# Patient Record
Sex: FEMALE | Race: WHITE | NOT HISPANIC OR LATINO | Employment: OTHER | URBAN - METROPOLITAN AREA
[De-identification: names, ages, dates, MRNs, and addresses within clinical notes are randomized per-mention and may not be internally consistent; named-entity substitution may affect disease eponyms.]

---

## 2023-03-01 PROBLEM — E22.1 HYPERPROLACTINEMIA (MULTI): Status: ACTIVE | Noted: 2023-03-01

## 2023-03-01 PROBLEM — F50.9 EATING DISORDER IN REMISSION: Status: ACTIVE | Noted: 2023-03-01

## 2023-03-01 PROBLEM — R92.30 DENSE BREASTS: Status: ACTIVE | Noted: 2023-03-01

## 2023-03-01 PROBLEM — R53.83 FATIGUE: Status: ACTIVE | Noted: 2023-03-01

## 2023-03-01 PROBLEM — F31.9 BIPOLAR 1 DISORDER (MULTI): Status: ACTIVE | Noted: 2023-03-01

## 2023-03-01 PROBLEM — R92.2 DENSE BREASTS: Status: ACTIVE | Noted: 2023-03-01

## 2023-03-01 PROBLEM — H61.23 BILATERAL IMPACTED CERUMEN: Status: ACTIVE | Noted: 2023-03-01

## 2023-03-01 PROBLEM — H91.91 HEARING LOSS IN RIGHT EAR: Status: ACTIVE | Noted: 2023-03-01

## 2023-03-01 PROBLEM — R63.4 WEIGHT LOSS, UNINTENTIONAL: Status: ACTIVE | Noted: 2023-03-01

## 2023-03-01 PROBLEM — G47.00 INSOMNIA: Status: ACTIVE | Noted: 2023-03-01

## 2023-03-01 PROBLEM — J45.909 ASTHMA, WELL CONTROLLED (HHS-HCC): Status: ACTIVE | Noted: 2023-03-01

## 2023-03-01 PROBLEM — H91.92 HEARING DIFFICULTY OF LEFT EAR: Status: ACTIVE | Noted: 2023-03-01

## 2023-03-01 PROBLEM — H10.13 ALLERGIC CONJUNCTIVITIS OF BOTH EYES: Status: ACTIVE | Noted: 2023-03-01

## 2023-03-01 PROBLEM — D12.6 ADENOMATOUS POLYP OF COLON: Status: ACTIVE | Noted: 2023-03-01

## 2023-03-01 PROBLEM — F10.21 ALCOHOLISM IN REMISSION (MULTI): Status: ACTIVE | Noted: 2023-03-01

## 2023-03-01 PROBLEM — M85.80 OSTEOPENIA: Status: ACTIVE | Noted: 2023-03-01

## 2023-03-01 PROBLEM — R73.01 ELEVATED FASTING BLOOD SUGAR: Status: ACTIVE | Noted: 2023-03-01

## 2023-03-01 PROBLEM — M19.041 OSTEOARTHRITIS OF FINGER, RIGHT: Status: ACTIVE | Noted: 2023-03-01

## 2023-03-01 PROBLEM — H61.23 EXCESSIVE CERUMEN IN BOTH EAR CANALS: Status: ACTIVE | Noted: 2023-03-01

## 2023-03-01 PROBLEM — E78.00 HYPERCHOLESTEROLEMIA: Status: ACTIVE | Noted: 2023-03-01

## 2023-03-01 PROBLEM — E55.9 VITAMIN D DEFICIENCY: Status: ACTIVE | Noted: 2023-03-01

## 2023-03-01 PROBLEM — F17.200 CURRENT EVERY DAY SMOKER: Status: ACTIVE | Noted: 2023-03-01

## 2023-03-01 PROBLEM — K21.9 ESOPHAGEAL REFLUX: Status: ACTIVE | Noted: 2023-03-01

## 2023-03-01 RX ORDER — ATORVASTATIN CALCIUM 40 MG/1
1 TABLET, FILM COATED ORAL DAILY
COMMUNITY
Start: 2019-02-05 | End: 2023-03-09 | Stop reason: SDUPTHER

## 2023-03-01 RX ORDER — RISPERIDONE 0.5 MG/1
TABLET ORAL
COMMUNITY
Start: 2018-01-31

## 2023-03-01 RX ORDER — TRAZODONE HYDROCHLORIDE 50 MG/1
1.5 TABLET ORAL NIGHTLY
COMMUNITY
Start: 2017-03-20 | End: 2023-03-09 | Stop reason: SDUPTHER

## 2023-03-01 RX ORDER — DESVENLAFAXINE 50 MG/1
1 TABLET, EXTENDED RELEASE ORAL
COMMUNITY
Start: 2017-05-21

## 2023-03-01 RX ORDER — RISPERIDONE 1 MG/1
1 TABLET ORAL EVERY MORNING
COMMUNITY
Start: 2021-03-31

## 2023-03-01 RX ORDER — LAMOTRIGINE 200 MG/1
1 TABLET ORAL EVERY MORNING
COMMUNITY
Start: 2018-02-01

## 2023-03-01 RX ORDER — GABAPENTIN 300 MG/1
2 CAPSULE ORAL NIGHTLY
COMMUNITY
Start: 2017-11-27 | End: 2023-04-24 | Stop reason: SDUPTHER

## 2023-03-09 ENCOUNTER — OFFICE VISIT (OUTPATIENT)
Dept: PRIMARY CARE | Facility: CLINIC | Age: 63
End: 2023-03-09
Payer: MEDICARE

## 2023-03-09 VITALS
OXYGEN SATURATION: 96 % | HEART RATE: 62 BPM | HEIGHT: 62 IN | WEIGHT: 122.2 LBS | DIASTOLIC BLOOD PRESSURE: 80 MMHG | SYSTOLIC BLOOD PRESSURE: 130 MMHG | BODY MASS INDEX: 22.49 KG/M2

## 2023-03-09 DIAGNOSIS — Z12.31 SCREENING MAMMOGRAM, ENCOUNTER FOR: ICD-10-CM

## 2023-03-09 DIAGNOSIS — F10.21 ALCOHOLISM IN REMISSION (MULTI): ICD-10-CM

## 2023-03-09 DIAGNOSIS — F51.04 CHRONIC INSOMNIA: ICD-10-CM

## 2023-03-09 DIAGNOSIS — Z00.00 MEDICARE ANNUAL WELLNESS VISIT, INITIAL: Primary | ICD-10-CM

## 2023-03-09 DIAGNOSIS — E22.1 HYPERPROLACTINEMIA (MULTI): ICD-10-CM

## 2023-03-09 DIAGNOSIS — F17.200 CURRENT EVERY DAY SMOKER: ICD-10-CM

## 2023-03-09 DIAGNOSIS — E78.00 HYPERCHOLESTEROLEMIA: ICD-10-CM

## 2023-03-09 DIAGNOSIS — F17.210 CIGARETTE NICOTINE DEPENDENCE WITHOUT COMPLICATION: ICD-10-CM

## 2023-03-09 DIAGNOSIS — Z78.0 ASYMPTOMATIC MENOPAUSE: ICD-10-CM

## 2023-03-09 DIAGNOSIS — F31.9 BIPOLAR 1 DISORDER (MULTI): ICD-10-CM

## 2023-03-09 PROBLEM — R73.01 ELEVATED FASTING BLOOD SUGAR: Status: RESOLVED | Noted: 2023-03-01 | Resolved: 2023-03-09

## 2023-03-09 PROBLEM — R53.83 FATIGUE: Status: RESOLVED | Noted: 2023-03-01 | Resolved: 2023-03-09

## 2023-03-09 PROBLEM — H10.13 ALLERGIC CONJUNCTIVITIS OF BOTH EYES: Status: RESOLVED | Noted: 2023-03-01 | Resolved: 2023-03-09

## 2023-03-09 PROBLEM — H61.23 EXCESSIVE CERUMEN IN BOTH EAR CANALS: Status: RESOLVED | Noted: 2023-03-01 | Resolved: 2023-03-09

## 2023-03-09 PROBLEM — R63.4 WEIGHT LOSS, UNINTENTIONAL: Status: RESOLVED | Noted: 2023-03-01 | Resolved: 2023-03-09

## 2023-03-09 PROBLEM — H61.23 BILATERAL IMPACTED CERUMEN: Status: RESOLVED | Noted: 2023-03-01 | Resolved: 2023-03-09

## 2023-03-09 PROCEDURE — 99214 OFFICE O/P EST MOD 30 MIN: CPT | Performed by: STUDENT IN AN ORGANIZED HEALTH CARE EDUCATION/TRAINING PROGRAM

## 2023-03-09 PROCEDURE — G0402 INITIAL PREVENTIVE EXAM: HCPCS | Performed by: STUDENT IN AN ORGANIZED HEALTH CARE EDUCATION/TRAINING PROGRAM

## 2023-03-09 PROCEDURE — G0403 EKG FOR INITIAL PREVENT EXAM: HCPCS | Performed by: STUDENT IN AN ORGANIZED HEALTH CARE EDUCATION/TRAINING PROGRAM

## 2023-03-09 PROCEDURE — 1159F MED LIST DOCD IN RCRD: CPT | Performed by: STUDENT IN AN ORGANIZED HEALTH CARE EDUCATION/TRAINING PROGRAM

## 2023-03-09 PROCEDURE — 4004F PT TOBACCO SCREEN RCVD TLK: CPT | Performed by: STUDENT IN AN ORGANIZED HEALTH CARE EDUCATION/TRAINING PROGRAM

## 2023-03-09 PROCEDURE — 1160F RVW MEDS BY RX/DR IN RCRD: CPT | Performed by: STUDENT IN AN ORGANIZED HEALTH CARE EDUCATION/TRAINING PROGRAM

## 2023-03-09 PROCEDURE — 1157F ADVNC CARE PLAN IN RCRD: CPT | Performed by: STUDENT IN AN ORGANIZED HEALTH CARE EDUCATION/TRAINING PROGRAM

## 2023-03-09 PROCEDURE — 1170F FXNL STATUS ASSESSED: CPT | Performed by: STUDENT IN AN ORGANIZED HEALTH CARE EDUCATION/TRAINING PROGRAM

## 2023-03-09 RX ORDER — TRAZODONE HYDROCHLORIDE 50 MG/1
75 TABLET ORAL NIGHTLY
Qty: 135 TABLET | Refills: 3 | Status: SHIPPED | OUTPATIENT
Start: 2023-03-09 | End: 2024-03-01 | Stop reason: SDUPTHER

## 2023-03-09 RX ORDER — ATORVASTATIN CALCIUM 40 MG/1
40 TABLET, FILM COATED ORAL DAILY
Qty: 90 TABLET | Refills: 3 | Status: SHIPPED | OUTPATIENT
Start: 2023-03-09 | End: 2024-04-02 | Stop reason: SDUPTHER

## 2023-03-09 ASSESSMENT — ACTIVITIES OF DAILY LIVING (ADL)
GROCERY_SHOPPING: INDEPENDENT
MANAGING_FINANCES: INDEPENDENT
TAKING_MEDICATION: INDEPENDENT
DRESSING: INDEPENDENT
BATHING: INDEPENDENT
DOING_HOUSEWORK: INDEPENDENT

## 2023-03-09 ASSESSMENT — PATIENT HEALTH QUESTIONNAIRE - PHQ9
1. LITTLE INTEREST OR PLEASURE IN DOING THINGS: NOT AT ALL
2. FEELING DOWN, DEPRESSED OR HOPELESS: NOT AT ALL
SUM OF ALL RESPONSES TO PHQ9 QUESTIONS 1 AND 2: 0

## 2023-03-09 NOTE — LETTER
April 24, 2023     Karen Joshua  [address] [address]      Dear Ms. Joshua:    Below are the results from your recent visit:  Normal screening mammogram     Resulted Orders   BI mammo bilateral screening tomosynthesis    Narrative    Interpreted By:  TIP ALCARAZ MD  MRN: [id number]  Patient Name: KAREN JOSHUA     STUDY:  DIGITAL MAMM SCREENING W/ PAULINE;  4/21/2023 1:37 pm     ACCESSION NUMBER(S):  [id number]     ORDERING CLINICIAN:  SOM WATTS     INDICATION:  Screening.     COMPARISON:  02/23/2021 03/16/2017     FINDINGS:  2D and tomosynthesis images were reviewed at 1 mm slice thickness.     The breast tissue is heterogeneously dense, which may obscure small  masses.   No suspicious masses or calcifications are identified.       Impression    No mammographic evidence of malignancy.     BI-RADS CATEGORY:     Category: 1 - Negative.  Recommendation: 1 Year Screening.     For any future breast imaging appointments, please call [phone]  [phone]).               CT lung screening low dose    Narrative    Interpreted By:  SWAPNA RECIO MD  MRN: [id number]  Patient Name: KAREN JOSHUA     STUDY:  TH CT CHEST LOW DOSE FOR LUNG SCREENING WO CONTRAST;  4/21/2023 1:52  pm     INDICATION:  Smoker screening     COMPARISON:  No prior     ACCESSION NUMBER(S):  [id number]     ORDERING CLINICIAN:  SOM WATTS     TECHNIQUE:  Helical data acquisition of the chest was obtained without IV  contrast material.  Images were reformatted in axial, coronal, and  sagittal planes.     FINDINGS:  LUNGS AND AIRWAYS:  Mild generalized airway thickening no justin emphysematous change.  Coarsely calcified right middle lobe granulomata association with  linear presumed scarring. Additional scattered areas of mild linear  atelectasis or scarring in the lower lungs. Other scattered less than  4 mm nodular densities bilaterally, 1 of which is calcified on the  right. These are annotated for reference on  series 3. No mass, dense  consolidation or central airway obstruction.     MEDIASTINUM AND CARLOS, LOWER NECK AND AXILLA:  No intrathoracic lymphadenopathy. Nondilated esophagus. No masses are  identified in the lower neck.     HEART AND VESSELS:  Normal heart size. No pericardial effusion. Normal caliber thoracic  aorta and pulmonary trunk. Mild atherosclerotic calcifications.     UPPER ABDOMEN:  Low-attenuation 1.9 cm right hepatic lobe lesion on series 2, image  57 averaging 44 Hounsfield units.     CHEST WALL AND OSSEOUS STRUCTURES:  No significant soft tissue findings. No aggressive osseous finding on  CT.       Impression    1. Tiny noncalcified nodular densities bilaterally of doubtful  clinical significance. Advise ongoing annual low-dose lung cancer  screening CT.  2. 1.9 cm low-attenuation right hepatic lobe lesion measures greater  than simple fluid attenuation and is presumably benign in the absence  of a known underlying malignancy, probably a hemangioma. This could  be correlated with a dedicated hepatic protocol cross-sectional  imaging study of the abdomen if there is clinical concern.           LUNG RADS CATEGORY:  Lung-RADS 2, S (Benign Appearance or Indolent Behavior): Continue  with annual screening in 12 months,  CT Chest Lung Ca Screen  Initial or Follow up LR1/LR2/Continuation of Screening Low Dose  recommended as per American College of Radiology Guidelines Lung-RADS  Version 2022.  Lung-RADS S (Other non-nodular findings) Management as  appropriate to finding per American College of Radiology Guidelines  Lung-RADS Version 2022      XR DEXA bone density    Narrative    Interpreted By:  MARSHAL BYRNE MD  MRN: [id number]  Patient Name: KAREN HYLTON     STUDY:  BONE DENSITY, DEXA 1 OR MORE SITES: AXIAL SKELETN4/21/2023 1:10 pm     INDICATION:  screening. The patient is a 64 y/o  year old F.     COMPARISON:  None.     ACCESSION NUMBER(S):  [id number]     ORDERING CLINICIAN:  SOM  MAC     TECHNIQUE:  BONE DENSITY, DEXA 1 OR MORE SITES: AXIAL SKELETN     FINDINGS:  SPINE L1-L4  Bone Mineral Density: 0.855 g/cm2  T-Score -1.7  Z-Score 0.0  Classification:  Osteopenia  Bone Mineral Density change vs baseline:  Not reported  Bone Mineral Density change vs previous: Not reported     LEFT FEMUR -TOTAL  Bone Mineral Density: 0.764 g/cm2  T-Score -1.5   Z-Score  -0.2  Classification:  Osteopenia  Bone Mineral Density change vs baseline: Not reported  Bone Mineral Density change vs previous: Not reported     LEFT FEMUR -NECK  Bone Mineral Density: 0.655 g/cm2  T-Score -1.7  Z-Score -0.2  Classification:  Osteopenia        World Health Organization (WHO) criteria for post-menopausal,   Women:  Normal:         T-score at or above -1 SD  Osteopenia:   T-score between -1 and -2.5 SD  Osteoporosis: T-score at or below -2.5 SD        10-year Fracture Risk:  Major Osteoporotic Fracture  8.8 %  Hip Fracture                        1.9 %     Note:  If no FRAX score is reported, it is because:  Some T-score for Spine Total or Hip Total or Femoral Neck at or below  -2.5     This exam was performed at [facility] on a [facility]  [facility] Dexa Unit.             Impression    DEXA:  According to World Health Organization criteria,  classification is  low bone mass (osteopenia)     Followup recommended in two years or sooner as clinically warranted.     The test results show that your current treatment is working. Please review the attached information    If you have any questions or concerns, please don't hesitate to call.         Sincerely,        Myra Bingham MD

## 2023-03-09 NOTE — LETTER
April 24, 2023     Karen Yipthal  [address] [address]      Dear Ms. Joshua:    Below are the results from your recent visit:    Osteopenia  ( weaker bones ) noted on bone density scan .   Calcium + vitamin D + weight bearing exercise = strong bones .   1200 mg of Calcium per day ( This could be from diet as well as supplements , if you do not get enough from diet ) + 2000 U of Vit D3 everyday ( OTC ) is recommended .     There is loss of bone in women as a results of menopause. Some medical conditions can also results in loss of bone density sooner than expected . Smoking during one's life time , prolonged use of certain medications to treat other medical conditions also increases the risk of osteopenia.     Resulted Orders   XR DEXA bone density    Narrative    Interpreted By:  MARSHAL BYRNE MD  MRN: [id number]  Patient Name: KAREN JOSHUA     STUDY:  BONE DENSITY, DEXA 1 OR MORE SITES: AXIAL SKELETN4/21/2023 1:10 pm     INDICATION:  screening. The patient is a 62 y/o  year old F.     COMPARISON:  None.     ACCESSION NUMBER(S):  [id number]     ORDERING CLINICIAN:  SOM WATTS     TECHNIQUE:  BONE DENSITY, DEXA 1 OR MORE SITES: AXIAL SKELETN     FINDINGS:  SPINE L1-L4  Bone Mineral Density: 0.855 g/cm2  T-Score -1.7  Z-Score 0.0  Classification:  Osteopenia  Bone Mineral Density change vs baseline:  Not reported  Bone Mineral Density change vs previous: Not reported     LEFT FEMUR -TOTAL  Bone Mineral Density: 0.764 g/cm2  T-Score -1.5   Z-Score  -0.2  Classification:  Osteopenia  Bone Mineral Density change vs baseline: Not reported  Bone Mineral Density change vs previous: Not reported     LEFT FEMUR -NECK  Bone Mineral Density: 0.655 g/cm2  T-Score -1.7  Z-Score -0.2  Classification:  Osteopenia        World Health Organization (WHO) criteria for post-menopausal,   Women:  Normal:         T-score at or above -1 SD  Osteopenia:   T-score between -1 and -2.5  SD  Osteoporosis: T-score at or below -2.5 SD        10-year Fracture Risk:  Major Osteoporotic Fracture  8.8 %  Hip Fracture                        1.9 %     Note:  If no FRAX score is reported, it is because:  Some T-score for Spine Total or Hip Total or Femoral Neck at or below  -2.5     This exam was performed at [facility] on a [facility] Dexa Unit.             Impression    DEXA:  According to World Health Organization criteria,  classification is  low bone mass (osteopenia)     Followup recommended in two years or sooner as clinically warranted.     The test results show that your current treatment is working. Please review the attached information    If you have any questions or concerns, please don't hesitate to call.         Sincerely,        Myra Bingham MD

## 2023-03-09 NOTE — PROGRESS NOTES
"Subjective   Reason for Visit: Sherry Joshua is an 63 y.o. female here for a Medicare Wellness visit.      Reviewed all medications by prescribing practitioner or clinical pharmacist (such as prescriptions, OTCs, herbal therapies and supplements) and documented in the medical record.    HPI     62 y/o female with eating disorder , former alcoholic , current smoker ( 4 cigarettes per day as of 3/3/22) , bipolar disorder( established with Psychiatry ) , insomnia , HPL            Patient Care Team:  Myra Bingham MD as PCP - General     Review of Systems    Constitutional: no chills, no fever and no night sweats.     Eyes: no blurred vision and no eyesight problems.     ENT: no hearing loss, no nasal congestion, no nasal discharge, no hoarseness and no sore throat.     Cardiovascular: no chest pain, no intermittent leg claudication, no lower extremity edema, no palpitations and no syncope.     Respiratory: no cough, no shortness of breath during exertion, no shortness of breath at rest and no wheezing.     Gastrointestinal: no abdominal pain, no blood in stools, no constipation, no diarrhea, no melena, no nausea, no rectal pain and no vomiting.     Genitourinary: no dysuria, no change in urinary frequency, no urinary hesitancy, no feelings of urinary urgency and no vaginal discharge.     Musculoskeletal: no arthralgias, no back pain and no myalgias.     Integumentary: no new skin lesions and no rashes.     Neurological: no difficulty walking, no headache, no limb weakness, no numbness and no tingling.     Psychiatric: no anxiety, no depression, no anhedonia and no substance use disorders.     Endocrine: no recent weight gain and no recent weight loss.     Hematologic/Lymphatic: no tendency for easy bruising and no swollen glands.          All other systems have been reviewed and are negative for complaint.      Objective   Vitals:  Pulse 62   Ht 1.575 m (5' 2\")   Wt 55.4 kg (122 lb 3.2 oz)   SpO2 96%   " BMI 22.35 kg/m²       Physical Exam    Constitutional: Alert and in no acute distress. Well developed, well nourished.     Eyes: Normal external exam. Pupils were equal in size, round, reactive to light (PERRL) with normal accommodation and extraocular movements intact (EOMI).     Ears, Nose, Mouth, and Throat: External inspection of ears and nose: Normal.  Otoscopic examination: Normal.      Neck: No neck mass was observed. Supple.     Cardiovascular: Heart rate and rhythm were normal, normal S1 and S2, no gallops, no murmurs and no pericardial rub    Pulmonary: No respiratory distress. Clear bilateral breath sounds.     Abdomen: Soft nontender; no abdominal mass palpated. No organomegaly.     Genitourinary: no dysuria, no change in urinary frequency, no urinary hesitancy, no feelings of urinary urgency and no vaginal discharge.    Genitourinary: The testicles were not swollen and there were no testicular masses. No penile abnormalities.      Musculoskeletal: No joint swelling seen, normal movements of all extremities. Range of motion: Normal.  Muscle strength/tone: Normal.      Skin: Normal skin color and pigmentation, normal skin turgor, and no rash.     Neurologic: Deep tendon reflexes were 2+ and symmetric. Sensation: Normal.     Psychiatric: Judgment and insight: Intact. Mood and affect: Normal.    Lymphatic : Cervical/ axillary/ groin Lns Palpable/ non palpable   Assessment/Plan   64 y/o female with eating disorder , former alcoholic , current smoker ( 4 cigarettes per day as of 3/3/22) , bipolar disorder( established with Psychiatry ) , insomnia , HPL        Chronic medical conditions: Reviewed , assessed , stable, meds refilled.   - HPL   - Chronic insomnia     Age appropriate labs / labs for mgmt of chronic medical conditions ordered, further mgmt pending the results.      Risk factors identified during visit :   None      Immunizations :  Influenza : Given today or previously / recommended annually in the  fall/ declined/ allergic   Prevnar 20 : Given today or previously / recommended / declined/ allergic   Pneumovax 23: Given today or previously / recommended / declined/ allergic   Shingles: Given today or previously / recommended to receive at the pharmacy/ declined/ allergic    Cancer screenings:   Mammogram :   Screening ORdered  Cervical cancer:  Screening current. 8/2022, NILM, HPV negative   Colon cancer:     Screening current , due 10/2026  Lung cancer :     Screening current /ordered/ declined/ not indicated  HIV screening:   Screening  not indicated  Osteoporosis :   Screening ordered    Advance care planning :   Has a living will .   Declined life support .    RTO in a year or sooner if needed

## 2023-03-09 NOTE — LETTER
April 24, 2023     Karen Hylton  [address] [address]      Dear Ms. Hylton:    Below are the results from your recent visit:      No cancer seen in the lungs   Rpt In 1 yr   Resulted Orders   CT lung screening low dose    Narrative    Interpreted By:  SWAPNA RECIO MD  MRN: [id number]  Patient Name: KAREN HYLTON     STUDY:  TH CT CHEST LOW DOSE FOR LUNG SCREENING WO CONTRAST;  4/21/2023 1:52  pm     INDICATION:  Smoker screening     COMPARISON:  No prior     ACCESSION NUMBER(S):  [id number]     ORDERING CLINICIAN:  SOM WATTS     TECHNIQUE:  Helical data acquisition of the chest was obtained without IV  contrast material.  Images were reformatted in axial, coronal, and  sagittal planes.     FINDINGS:  LUNGS AND AIRWAYS:  Mild generalized airway thickening no justin emphysematous change.  Coarsely calcified right middle lobe granulomata association with  linear presumed scarring. Additional scattered areas of mild linear  atelectasis or scarring in the lower lungs. Other scattered less than  4 mm nodular densities bilaterally, 1 of which is calcified on the  right. These are annotated for reference on series 3. No mass, dense  consolidation or central airway obstruction.     MEDIASTINUM AND CARLOS, LOWER NECK AND AXILLA:  No intrathoracic lymphadenopathy. Nondilated esophagus. No masses are  identified in the lower neck.     HEART AND VESSELS:  Normal heart size. No pericardial effusion. Normal caliber thoracic  aorta and pulmonary trunk. Mild atherosclerotic calcifications.     UPPER ABDOMEN:  Low-attenuation 1.9 cm right hepatic lobe lesion on series 2, image  57 averaging 44 Hounsfield units.     CHEST WALL AND OSSEOUS STRUCTURES:  No significant soft tissue findings. No aggressive osseous finding on  CT.       Impression    1. Tiny noncalcified nodular densities bilaterally of doubtful  clinical significance. Advise ongoing annual low-dose lung cancer  screening CT.  2. 1.9 cm  low-attenuation right hepatic lobe lesion measures greater  than simple fluid attenuation and is presumably benign in the absence  of a known underlying malignancy, probably a hemangioma. This could  be correlated with a dedicated hepatic protocol cross-sectional  imaging study of the abdomen if there is clinical concern.           LUNG RADS CATEGORY:  Lung-RADS 2, S (Benign Appearance or Indolent Behavior): Continue  with annual screening in 12 months,  CT Chest Lung Ca Screen  Initial or Follow up LR1/LR2/Continuation of Screening Low Dose  recommended as per American College of Radiology Guidelines Lung-RADS  Version 2022.  Lung-RADS S (Other non-nodular findings) Management as  appropriate to finding per American College of Radiology Guidelines  Lung-RADS Version 2022      XR DEXA bone density    Narrative    Interpreted By:  MARSHAL BYRNE MD  MRN: [id number]  Patient Name: KAREN HYLTON     STUDY:  BONE DENSITY, DEXA 1 OR MORE SITES: AXIAL SKELETN4/21/2023 1:10 pm     INDICATION:  screening. The patient is a 64 y/o  year old F.     COMPARISON:  None.     ACCESSION NUMBER(S):  [id number]     ORDERING CLINICIAN:  SOM WATTS     TECHNIQUE:  BONE DENSITY, DEXA 1 OR MORE SITES: AXIAL SKELETN     FINDINGS:  SPINE L1-L4  Bone Mineral Density: 0.855 g/cm2  T-Score -1.7  Z-Score 0.0  Classification:  Osteopenia  Bone Mineral Density change vs baseline:  Not reported  Bone Mineral Density change vs previous: Not reported     LEFT FEMUR -TOTAL  Bone Mineral Density: 0.764 g/cm2  T-Score -1.5   Z-Score  -0.2  Classification:  Osteopenia  Bone Mineral Density change vs baseline: Not reported  Bone Mineral Density change vs previous: Not reported     LEFT FEMUR -NECK  Bone Mineral Density: 0.655 g/cm2  T-Score -1.7  Z-Score -0.2  Classification:  Osteopenia        World Health Organization (WHO) criteria for post-menopausal,   Women:  Normal:         T-score at or above -1 SD  Osteopenia:   T-score  between -1 and -2.5 SD  Osteoporosis: T-score at or below -2.5 SD        10-year Fracture Risk:  Major Osteoporotic Fracture  8.8 %  Hip Fracture                        1.9 %     Note:  If no FRAX score is reported, it is because:  Some T-score for Spine Total or Hip Total or Femoral Neck at or below  -2.5     This exam was performed at [facility] on a [facility] Dexa Unit.             Impression    DEXA:  According to World Health Organization criteria,  classification is  low bone mass (osteopenia)     Followup recommended in two years or sooner as clinically warranted.     The test results show that your current treatment is working. Please review the attached information    If you have any questions or concerns, please don't hesitate to call.         Sincerely,        Myra Bingham MD

## 2023-03-10 ASSESSMENT — ACTIVITIES OF DAILY LIVING (ADL)
MANAGING_FINANCES: INDEPENDENT
BATHING: INDEPENDENT
GROCERY_SHOPPING: INDEPENDENT
TAKING_MEDICATION: INDEPENDENT
DRESSING: INDEPENDENT
DOING_HOUSEWORK: INDEPENDENT

## 2023-03-10 ASSESSMENT — PATIENT HEALTH QUESTIONNAIRE - PHQ9
SUM OF ALL RESPONSES TO PHQ9 QUESTIONS 1 AND 2: 0
2. FEELING DOWN, DEPRESSED OR HOPELESS: NOT AT ALL
1. LITTLE INTEREST OR PLEASURE IN DOING THINGS: NOT AT ALL

## 2023-03-10 NOTE — ADDENDUM NOTE
Addended by: ADRIENNE WATTS on: 3/9/2023 07:25 PM     Modules accepted: Orders, Level of Service

## 2023-03-14 PROCEDURE — G0009 ADMIN PNEUMOCOCCAL VACCINE: HCPCS | Performed by: STUDENT IN AN ORGANIZED HEALTH CARE EDUCATION/TRAINING PROGRAM

## 2023-03-14 PROCEDURE — 90677 PCV20 VACCINE IM: CPT | Performed by: STUDENT IN AN ORGANIZED HEALTH CARE EDUCATION/TRAINING PROGRAM

## 2023-03-14 ASSESSMENT — ACTIVITIES OF DAILY LIVING (ADL)
GROCERY_SHOPPING: INDEPENDENT
MANAGING_FINANCES: INDEPENDENT
BATHING: INDEPENDENT
DOING_HOUSEWORK: INDEPENDENT
DRESSING: INDEPENDENT
TAKING_MEDICATION: INDEPENDENT

## 2023-04-24 DIAGNOSIS — F31.9 BIPOLAR 1 DISORDER (MULTI): ICD-10-CM

## 2023-04-24 DIAGNOSIS — G47.00 INSOMNIA, UNSPECIFIED TYPE: ICD-10-CM

## 2023-04-24 DIAGNOSIS — F50.9 EATING DISORDER IN REMISSION: ICD-10-CM

## 2023-04-24 NOTE — TELEPHONE ENCOUNTER
----- Message from Myra Bingham MD sent at 4/24/2023  2:44 PM EDT -----  Normal screening mammogram

## 2023-04-24 NOTE — TELEPHONE ENCOUNTER
----- Message from Myra Bingham MD sent at 4/24/2023  2:42 PM EDT -----  No cancer seen in the lungs   Rpt In 1 yr

## 2023-04-24 NOTE — TELEPHONE ENCOUNTER
----- Message from Myra Bingham MD sent at 4/24/2023  2:37 PM EDT -----  Mail this please   Osteopenia  ( weaker bones ) noted on bone density scan .  Calcium + vitamin D + weight bearing exercise = strong bones .  1200 mg of Calcium per day ( This could be from diet as well as supplements , if you do not get enough from diet ) + 2000 U of Vit D3 everyday ( OTC ) is recommended .    There is loss of bone in women as a results of menopause. Some medical conditions can also results in loss of bone density sooner than expected . Smoking during one's life time , prolonged use of certain medications to treat other medical conditions also increases the risk of osteopenia.

## 2023-04-26 RX ORDER — GABAPENTIN 300 MG/1
600 CAPSULE ORAL NIGHTLY
Qty: 180 CAPSULE | Refills: 3 | Status: SHIPPED | OUTPATIENT
Start: 2023-04-26 | End: 2023-10-23 | Stop reason: SDUPTHER

## 2023-10-23 ENCOUNTER — LAB (OUTPATIENT)
Dept: LAB | Facility: LAB | Age: 63
End: 2023-10-23
Payer: MEDICARE

## 2023-10-23 DIAGNOSIS — Z79.899 OTHER LONG TERM (CURRENT) DRUG THERAPY: Primary | ICD-10-CM

## 2023-10-23 DIAGNOSIS — F50.9 EATING DISORDER IN REMISSION: ICD-10-CM

## 2023-10-23 DIAGNOSIS — F31.9 BIPOLAR 1 DISORDER (MULTI): ICD-10-CM

## 2023-10-23 DIAGNOSIS — G47.00 INSOMNIA, UNSPECIFIED TYPE: ICD-10-CM

## 2023-10-23 LAB
ALBUMIN SERPL BCP-MCNC: 4.3 G/DL (ref 3.4–5)
ALP SERPL-CCNC: 79 U/L (ref 33–136)
ALT SERPL W P-5'-P-CCNC: 11 U/L (ref 7–45)
ANION GAP SERPL CALC-SCNC: 12 MMOL/L (ref 10–20)
AST SERPL W P-5'-P-CCNC: 13 U/L (ref 9–39)
BASOPHILS # BLD AUTO: 0.05 X10*3/UL (ref 0–0.1)
BASOPHILS NFR BLD AUTO: 1.1 %
BILIRUB SERPL-MCNC: 0.5 MG/DL (ref 0–1.2)
BUN SERPL-MCNC: 15 MG/DL (ref 6–23)
CALCIUM SERPL-MCNC: 9.3 MG/DL (ref 8.6–10.6)
CHLORIDE SERPL-SCNC: 108 MMOL/L (ref 98–107)
CHOLEST SERPL-MCNC: 179 MG/DL (ref 0–199)
CHOLESTEROL/HDL RATIO: 2.1
CO2 SERPL-SCNC: 27 MMOL/L (ref 21–32)
CREAT SERPL-MCNC: 0.91 MG/DL (ref 0.5–1.05)
EOSINOPHIL # BLD AUTO: 0.07 X10*3/UL (ref 0–0.7)
EOSINOPHIL NFR BLD AUTO: 1.6 %
ERYTHROCYTE [DISTWIDTH] IN BLOOD BY AUTOMATED COUNT: 12.4 % (ref 11.5–14.5)
EST. AVERAGE GLUCOSE BLD GHB EST-MCNC: 105 MG/DL
GFR SERPL CREATININE-BSD FRML MDRD: 70 ML/MIN/1.73M*2
GLUCOSE SERPL-MCNC: 102 MG/DL (ref 74–99)
HBA1C MFR BLD: 5.3 %
HCT VFR BLD AUTO: 41.3 % (ref 36–46)
HDLC SERPL-MCNC: 86.9 MG/DL
HGB BLD-MCNC: 13.8 G/DL (ref 12–16)
IMM GRANULOCYTES # BLD AUTO: 0.02 X10*3/UL (ref 0–0.7)
IMM GRANULOCYTES NFR BLD AUTO: 0.5 % (ref 0–0.9)
LDLC SERPL CALC-MCNC: 83 MG/DL
LYMPHOCYTES # BLD AUTO: 1.11 X10*3/UL (ref 1.2–4.8)
LYMPHOCYTES NFR BLD AUTO: 25.2 %
MCH RBC QN AUTO: 29.9 PG (ref 26–34)
MCHC RBC AUTO-ENTMCNC: 33.4 G/DL (ref 32–36)
MCV RBC AUTO: 90 FL (ref 80–100)
MONOCYTES # BLD AUTO: 0.32 X10*3/UL (ref 0.1–1)
MONOCYTES NFR BLD AUTO: 7.3 %
NEUTROPHILS # BLD AUTO: 2.84 X10*3/UL (ref 1.2–7.7)
NEUTROPHILS NFR BLD AUTO: 64.3 %
NON HDL CHOLESTEROL: 92 MG/DL (ref 0–149)
NRBC BLD-RTO: 0 /100 WBCS (ref 0–0)
PLATELET # BLD AUTO: 233 X10*3/UL (ref 150–450)
PMV BLD AUTO: 10.8 FL (ref 7.5–11.5)
POTASSIUM SERPL-SCNC: 4 MMOL/L (ref 3.5–5.3)
PROLACTIN SERPL-MCNC: 30.9 UG/L (ref 3–20)
PROT SERPL-MCNC: 5.8 G/DL (ref 6.4–8.2)
RBC # BLD AUTO: 4.61 X10*6/UL (ref 4–5.2)
SODIUM SERPL-SCNC: 143 MMOL/L (ref 136–145)
T4 FREE SERPL-MCNC: 0.96 NG/DL (ref 0.78–1.48)
TRIGL SERPL-MCNC: 46 MG/DL (ref 0–149)
TSH SERPL-ACNC: 2.21 MIU/L (ref 0.44–3.98)
VLDL: 9 MG/DL (ref 0–40)
WBC # BLD AUTO: 4.4 X10*3/UL (ref 4.4–11.3)

## 2023-10-23 PROCEDURE — 85025 COMPLETE CBC W/AUTO DIFF WBC: CPT

## 2023-10-23 PROCEDURE — 80061 LIPID PANEL: CPT

## 2023-10-23 PROCEDURE — 84443 ASSAY THYROID STIM HORMONE: CPT

## 2023-10-23 PROCEDURE — 83036 HEMOGLOBIN GLYCOSYLATED A1C: CPT

## 2023-10-23 PROCEDURE — 84146 ASSAY OF PROLACTIN: CPT

## 2023-10-23 PROCEDURE — 80053 COMPREHEN METABOLIC PANEL: CPT

## 2023-10-23 PROCEDURE — 84439 ASSAY OF FREE THYROXINE: CPT

## 2023-10-23 PROCEDURE — 36415 COLL VENOUS BLD VENIPUNCTURE: CPT

## 2023-10-24 RX ORDER — GABAPENTIN 300 MG/1
600 CAPSULE ORAL NIGHTLY
Qty: 180 CAPSULE | Refills: 3 | Status: SHIPPED | OUTPATIENT
Start: 2023-10-24

## 2024-03-01 DIAGNOSIS — F51.04 CHRONIC INSOMNIA: ICD-10-CM

## 2024-03-01 RX ORDER — TRAZODONE HYDROCHLORIDE 50 MG/1
75 TABLET ORAL NIGHTLY
Qty: 45 TABLET | Refills: 0 | Status: SHIPPED | OUTPATIENT
Start: 2024-03-01 | End: 2024-04-02 | Stop reason: SDUPTHER

## 2024-04-02 ENCOUNTER — OFFICE VISIT (OUTPATIENT)
Dept: PRIMARY CARE | Facility: CLINIC | Age: 64
End: 2024-04-02
Payer: MEDICARE

## 2024-04-02 VITALS
HEART RATE: 66 BPM | SYSTOLIC BLOOD PRESSURE: 130 MMHG | OXYGEN SATURATION: 95 % | BODY MASS INDEX: 22.6 KG/M2 | WEIGHT: 122.8 LBS | HEIGHT: 62 IN | DIASTOLIC BLOOD PRESSURE: 75 MMHG

## 2024-04-02 DIAGNOSIS — F31.4 BIPOLAR DISORDER, CURRENT EPISODE DEPRESSED, SEVERE, WITHOUT PSYCHOTIC FEATURES (MULTI): ICD-10-CM

## 2024-04-02 DIAGNOSIS — M85.80 OSTEOPENIA AFTER MENOPAUSE: ICD-10-CM

## 2024-04-02 DIAGNOSIS — Z23 NEED FOR INFLUENZA VACCINATION: ICD-10-CM

## 2024-04-02 DIAGNOSIS — E22.1 HYPERPROLACTINEMIA (MULTI): ICD-10-CM

## 2024-04-02 DIAGNOSIS — E78.00 HYPERCHOLESTEROLEMIA: ICD-10-CM

## 2024-04-02 DIAGNOSIS — E78.2 MIXED HYPERLIPIDEMIA: ICD-10-CM

## 2024-04-02 DIAGNOSIS — Z00.00 MEDICARE ANNUAL WELLNESS VISIT, SUBSEQUENT: Primary | ICD-10-CM

## 2024-04-02 DIAGNOSIS — F10.21 ALCOHOL DEPENDENCE, IN REMISSION (MULTI): ICD-10-CM

## 2024-04-02 DIAGNOSIS — F51.04 CHRONIC INSOMNIA: ICD-10-CM

## 2024-04-02 DIAGNOSIS — Z78.0 OSTEOPENIA AFTER MENOPAUSE: ICD-10-CM

## 2024-04-02 DIAGNOSIS — Z87.891 FORMER SMOKER: ICD-10-CM

## 2024-04-02 DIAGNOSIS — I10 PRIMARY HYPERTENSION: ICD-10-CM

## 2024-04-02 DIAGNOSIS — F17.210 CIGARETTE NICOTINE DEPENDENCE WITHOUT COMPLICATION: ICD-10-CM

## 2024-04-02 PROCEDURE — 4004F PT TOBACCO SCREEN RCVD TLK: CPT | Performed by: STUDENT IN AN ORGANIZED HEALTH CARE EDUCATION/TRAINING PROGRAM

## 2024-04-02 PROCEDURE — 1170F FXNL STATUS ASSESSED: CPT | Performed by: STUDENT IN AN ORGANIZED HEALTH CARE EDUCATION/TRAINING PROGRAM

## 2024-04-02 PROCEDURE — 1159F MED LIST DOCD IN RCRD: CPT | Performed by: STUDENT IN AN ORGANIZED HEALTH CARE EDUCATION/TRAINING PROGRAM

## 2024-04-02 PROCEDURE — 3078F DIAST BP <80 MM HG: CPT | Performed by: STUDENT IN AN ORGANIZED HEALTH CARE EDUCATION/TRAINING PROGRAM

## 2024-04-02 PROCEDURE — G0008 ADMIN INFLUENZA VIRUS VAC: HCPCS | Performed by: STUDENT IN AN ORGANIZED HEALTH CARE EDUCATION/TRAINING PROGRAM

## 2024-04-02 PROCEDURE — 1158F ADVNC CARE PLAN TLK DOCD: CPT | Performed by: STUDENT IN AN ORGANIZED HEALTH CARE EDUCATION/TRAINING PROGRAM

## 2024-04-02 PROCEDURE — 1123F ACP DISCUSS/DSCN MKR DOCD: CPT | Performed by: STUDENT IN AN ORGANIZED HEALTH CARE EDUCATION/TRAINING PROGRAM

## 2024-04-02 PROCEDURE — 90662 IIV NO PRSV INCREASED AG IM: CPT | Performed by: STUDENT IN AN ORGANIZED HEALTH CARE EDUCATION/TRAINING PROGRAM

## 2024-04-02 PROCEDURE — 1157F ADVNC CARE PLAN IN RCRD: CPT | Performed by: STUDENT IN AN ORGANIZED HEALTH CARE EDUCATION/TRAINING PROGRAM

## 2024-04-02 PROCEDURE — 1160F RVW MEDS BY RX/DR IN RCRD: CPT | Performed by: STUDENT IN AN ORGANIZED HEALTH CARE EDUCATION/TRAINING PROGRAM

## 2024-04-02 PROCEDURE — 3075F SYST BP GE 130 - 139MM HG: CPT | Performed by: STUDENT IN AN ORGANIZED HEALTH CARE EDUCATION/TRAINING PROGRAM

## 2024-04-02 PROCEDURE — 99214 OFFICE O/P EST MOD 30 MIN: CPT | Performed by: STUDENT IN AN ORGANIZED HEALTH CARE EDUCATION/TRAINING PROGRAM

## 2024-04-02 PROCEDURE — G0439 PPPS, SUBSEQ VISIT: HCPCS | Performed by: STUDENT IN AN ORGANIZED HEALTH CARE EDUCATION/TRAINING PROGRAM

## 2024-04-02 RX ORDER — TRAZODONE HYDROCHLORIDE 50 MG/1
75 TABLET ORAL NIGHTLY
Qty: 45 TABLET | Refills: 3 | Status: SHIPPED | OUTPATIENT
Start: 2024-04-02

## 2024-04-02 RX ORDER — ATORVASTATIN CALCIUM 40 MG/1
40 TABLET, FILM COATED ORAL DAILY
Qty: 90 TABLET | Refills: 3 | Status: SHIPPED | OUTPATIENT
Start: 2024-04-02

## 2024-04-02 ASSESSMENT — ACTIVITIES OF DAILY LIVING (ADL)
GROCERY_SHOPPING: INDEPENDENT
BATHING: INDEPENDENT
DOING_HOUSEWORK: INDEPENDENT
DRESSING: INDEPENDENT
MANAGING_FINANCES: INDEPENDENT
TAKING_MEDICATION: INDEPENDENT

## 2024-04-02 ASSESSMENT — PATIENT HEALTH QUESTIONNAIRE - PHQ9
2. FEELING DOWN, DEPRESSED OR HOPELESS: NOT AT ALL
SUM OF ALL RESPONSES TO PHQ9 QUESTIONS 1 AND 2: 0
1. LITTLE INTEREST OR PLEASURE IN DOING THINGS: NOT AT ALL

## 2024-04-02 NOTE — PROGRESS NOTES
Subjective   Reason for Visit: Sherry Joshua is an 64 y.o. female here for a Medicare Wellness visit.     Past Medical, Surgical, and Family History reviewed and updated in chart.    Reviewed all medications by prescribing practitioner or clinical pharmacist (such as prescriptions, OTCs, herbal therapies and supplements) and documented in the medical record.    HPI  63 y/o female with eating disorder , former alcoholic ,  former  smoker ( quit Jan 2024 ) , bipolar disorder( established with Psychiatry ) , insomnia , HPL      Sober from Alcohol for 8 years now   Quit smoking completely in Jan 2024, was smoking 4 per day prior to quitting , did smoked 1 ppd in the past     Patient Care Team:  Myra Bingham MD as PCP - General (Family Medicine)  Myra Bingham MD as PCP - [facility]     Review of Systems    Constitutional: no chills, no fever and no night sweats.     Eyes: no blurred vision and no eyesight problems.     ENT: no hearing loss, no nasal congestion, no nasal discharge, no hoarseness and no sore throat.     Cardiovascular: no chest pain, no intermittent leg claudication, no lower extremity edema, no palpitations and no syncope.     Respiratory: no cough, no shortness of breath during exertion, no shortness of breath at rest and no wheezing.     Gastrointestinal: no abdominal pain, no blood in stools, no constipation, no diarrhea, no melena, no nausea, no rectal pain and no vomiting.     Genitourinary: no dysuria, no change in urinary frequency, no urinary hesitancy, no feelings of urinary urgency and no vaginal discharge.     Musculoskeletal: no arthralgias, no back pain and no myalgias.     Integumentary: no new skin lesions and no rashes.     Neurological: no difficulty walking, no headache, no limb weakness, no numbness and no tingling.     Psychiatric: no anxiety, no depression, no anhedonia and no substance use disorders.     Endocrine: no recent weight gain and no  "recent weight loss.     Hematologic/Lymphatic: no tendency for easy bruising and no swollen glands.          All other systems have been reviewed and are negative for complaint.    Objective   Vitals:  /75   Pulse 66   Ht 1.575 m (5' 2\")   Wt 55.7 kg (122 lb 12.8 oz)   SpO2 95%   BMI 22.46 kg/m²       Physical Exam    Constitutional: Alert and in no acute distress. Well developed, well nourished.     Eyes: Normal external exam. Pupils were equal in size, round, reactive to light (PERRL) with normal accommodation and extraocular movements intact (EOMI).     Ears, Nose, Mouth, and Throat: External inspection of ears and nose: Normal.  Otoscopic examination: Normal.      Neck: No neck mass was observed. Supple.     Cardiovascular: Heart rate and rhythm were normal, normal S1 and S2, no gallops, no murmurs and no pericardial rub    Pulmonary: No respiratory distress. Clear bilateral breath sounds.     Abdomen: Soft nontender; no abdominal mass palpated. No organomegaly.     Musculoskeletal: No joint swelling seen, normal movements of all extremities. Range of motion: Normal.  Muscle strength/tone: Normal.        Neurologic: Deep tendon reflexes were 2+ and symmetric. Sensation: Normal.     Psychiatric: Judgment and insight: Intact. Mood and affect: Normal.    Assessment/Plan   Problem List Items Addressed This Visit       Hypercholesterolemia    Relevant Medications    atorvastatin (Lipitor) 40 mg tablet    Hyperprolactinemia (CMS/HCC)    Overview     Likely due to Haloperidol, workup not needed because PRL is below 300 on Haloperidol, no repeat needed          Other Visit Diagnoses       Medicare annual wellness visit, subsequent    -  Primary    Relevant Orders    Hemoglobin A1C    Need for influenza vaccination        Relevant Orders    Flu vaccine, quadrivalent, high-dose, preservative free, age 65y+ (FLUZONE) (Completed)    Bipolar disorder, current episode depressed, severe, without psychotic features " (CMS/HCC)        Alcohol dependence, in remission (CMS/HCC)        Mixed hyperlipidemia        Relevant Orders    Lipid Panel    TSH with reflex to Free T4 if abnormal    Primary hypertension        Relevant Orders    CBC    Comprehensive Metabolic Panel    Osteopenia after menopause        Relevant Orders    Vitamin D 25-Hydroxy,Total (for eval of Vitamin D levels)    Chronic insomnia        Relevant Medications    traZODone (Desyrel) 50 mg tablet    Former smoker        Cigarette nicotine dependence without complication        Relevant Orders    CT lung screening low dose          65 y/o female with eating disorder , former alcoholic , current smoker ( 4 cigarettes per day as of 3/3/22) , bipolar disorder( established with Psychiatry ) , insomnia , HPL     Chronic medical conditions: Reviewed , assessed , stable, meds refilled.   - HPL   - Chronic insomnia      Age appropriate labs / labs for mgmt of chronic medical conditions ordered, further mgmt pending the results.            Immunizations :  Influenza : Given.previously.    Prevnar 20 : Given Previously.  Pneumovax 23: Given previously   Shingles: Given previously .     Cancer screenings:   Mammogram :   Screening ORdered  Cervical cancer:  Screening current. 8/2022, NILM, HPV negative   Colon cancer:     Screening current , due 10/2026  Lung cancer :     Screening  ordered  HIV screening:   Screening  not indicated  Osteoporosis :   Osteopenia in 2023      Advance care planning :   Has a living will .   Declined life support .     RTO in a year or sooner if needed

## 2024-04-19 ENCOUNTER — HOSPITAL ENCOUNTER (OUTPATIENT)
Dept: RADIOLOGY | Facility: HOSPITAL | Age: 64
Discharge: HOME | End: 2024-04-19
Payer: MEDICARE

## 2024-04-19 DIAGNOSIS — F17.210 CIGARETTE NICOTINE DEPENDENCE WITHOUT COMPLICATION: ICD-10-CM

## 2024-04-19 PROCEDURE — 71271 CT THORAX LUNG CANCER SCR C-: CPT

## 2024-08-07 DIAGNOSIS — F51.04 CHRONIC INSOMNIA: ICD-10-CM

## 2024-08-07 RX ORDER — TRAZODONE HYDROCHLORIDE 50 MG/1
75 TABLET ORAL NIGHTLY
Qty: 135 TABLET | Refills: 3 | Status: SHIPPED | OUTPATIENT
Start: 2024-08-07

## 2024-10-07 DIAGNOSIS — G47.00 INSOMNIA, UNSPECIFIED TYPE: ICD-10-CM

## 2024-10-07 DIAGNOSIS — F50.9 EATING DISORDER IN REMISSION: ICD-10-CM

## 2024-10-07 DIAGNOSIS — F31.9 BIPOLAR 1 DISORDER (MULTI): ICD-10-CM

## 2024-10-07 RX ORDER — GABAPENTIN 300 MG/1
600 CAPSULE ORAL NIGHTLY
Qty: 180 CAPSULE | Refills: 3 | Status: SHIPPED | OUTPATIENT
Start: 2024-10-07

## 2024-11-10 ENCOUNTER — OFFICE VISIT (OUTPATIENT)
Dept: URGENT CARE | Age: 64
End: 2024-11-10
Payer: MEDICARE

## 2024-11-10 VITALS
DIASTOLIC BLOOD PRESSURE: 93 MMHG | WEIGHT: 121 LBS | TEMPERATURE: 98.2 F | SYSTOLIC BLOOD PRESSURE: 162 MMHG | HEART RATE: 64 BPM | BODY MASS INDEX: 22.26 KG/M2 | OXYGEN SATURATION: 90 % | HEIGHT: 62 IN

## 2024-11-10 DIAGNOSIS — J45.21 INTERMITTENT ASTHMA WITH ACUTE EXACERBATION, UNSPECIFIED ASTHMA SEVERITY (HHS-HCC): Primary | ICD-10-CM

## 2024-11-10 RX ORDER — ALBUTEROL SULFATE 90 UG/1
2 INHALANT RESPIRATORY (INHALATION) EVERY 6 HOURS PRN
Qty: 8 G | Refills: 0 | Status: SHIPPED | OUTPATIENT
Start: 2024-11-10

## 2024-11-10 RX ORDER — IPRATROPIUM BROMIDE AND ALBUTEROL SULFATE 2.5; .5 MG/3ML; MG/3ML
3 SOLUTION RESPIRATORY (INHALATION) ONCE
Status: COMPLETED | OUTPATIENT
Start: 2024-11-10 | End: 2024-11-10

## 2024-11-10 RX ORDER — PREDNISONE 20 MG/1
60 TABLET ORAL DAILY
Qty: 18 TABLET | Refills: 0 | Status: SHIPPED | OUTPATIENT
Start: 2024-11-10 | End: 2024-11-16

## 2024-11-10 RX ORDER — METHYLPREDNISOLONE SODIUM SUCCINATE 125 MG/2ML
125 INJECTION INTRAMUSCULAR; INTRAVENOUS ONCE
Status: COMPLETED | OUTPATIENT
Start: 2024-11-10 | End: 2024-11-10

## 2024-11-10 RX ADMIN — METHYLPREDNISOLONE SODIUM SUCCINATE 125 MG: 125 INJECTION INTRAMUSCULAR; INTRAVENOUS at 19:29

## 2024-11-10 RX ADMIN — IPRATROPIUM BROMIDE AND ALBUTEROL SULFATE 3 ML: 2.5; .5 SOLUTION RESPIRATORY (INHALATION) at 19:31

## 2024-11-10 ASSESSMENT — ENCOUNTER SYMPTOMS
WHEEZING: 1
COUGH: 1
CARDIOVASCULAR NEGATIVE: 1
CONSTITUTIONAL NEGATIVE: 1
SHORTNESS OF BREATH: 1
CHEST TIGHTNESS: 1

## 2024-11-11 NOTE — PATIENT INSTRUCTIONS
Use Albuterol inhaler every 3-4 hours  If you  developed breathing difficulty again go to ED  Follow up with your PCP in a week

## 2024-11-11 NOTE — PROGRESS NOTES
Subjective   Patient ID: Sherry Joshua is a 64 y.o. female. They present today with a chief complaint of No chief complaint on file..    History of Present Illness    Asthma  Associated symptoms: cough, shortness of breath and wheezing        Past Medical History  Allergies as of 11/10/2024 - Reviewed 04/02/2024   Allergen Reaction Noted    Penicillins Unknown 03/01/2023       (Not in a hospital admission)       Past Medical History:   Diagnosis Date    Basal cell carcinoma of skin of left upper limb, including shoulder     Basal cell carcinoma, hand, left    Basal cell carcinoma of skin of right upper limb, including shoulder 11/04/2015    Basal cell carcinoma of forearm, right    Encounter for immunization 01/06/2021    Need for shingles vaccine    Encounter for screening for malignant neoplasm of cervix     Screening for cervical cancer    Personal history of other diseases of the circulatory system 09/02/2020    History of hypotension    Personal history of other diseases of the digestive system     History of appendicitis    Personal history of other diseases of the musculoskeletal system and connective tissue     History of arthritis    Personal history of other mental and behavioral disorders 10/13/2015    History of bulimia nervosa    Personal history of other mental and behavioral disorders 10/13/2015    History of anorexia nervosa    Personal history of other specified conditions 03/20/2017    History of itching       Past Surgical History:   Procedure Laterality Date    APPENDECTOMY  10/13/2015    Appendectomy        reports that she has been smoking cigarettes. She has never used smokeless tobacco. She reports that she does not drink alcohol and does not use drugs.    Review of Systems  Review of Systems   Constitutional: Negative.    HENT: Negative.     Respiratory:  Positive for cough, chest tightness, shortness of breath and wheezing.    Cardiovascular: Negative.                                    Objective    There were no vitals filed for this visit.  No LMP recorded.    Physical Exam  Constitutional:       General: She is in acute distress.      Appearance: Normal appearance.   HENT:      Nose: Nose normal.      Mouth/Throat:      Pharynx: Oropharynx is clear.   Eyes:      Conjunctiva/sclera: Conjunctivae normal.      Pupils: Pupils are equal, round, and reactive to light.   Cardiovascular:      Rate and Rhythm: Normal rate and regular rhythm.   Pulmonary:      Effort: Prolonged expiration present.      Breath sounds: Examination of the right-upper field reveals decreased breath sounds and wheezing. Examination of the left-upper field reveals decreased breath sounds. Examination of the right-middle field reveals decreased breath sounds and wheezing. Examination of the left-middle field reveals decreased breath sounds. Examination of the right-lower field reveals decreased breath sounds. Examination of the left-lower field reveals decreased breath sounds. Decreased breath sounds and wheezing present.   Neurological:      Mental Status: She is alert.         Procedures    Point of Care Test & Imaging Results from this visit  No results found for this visit on 11/10/24.   No results found.    Diagnostic study results (if any) were reviewed by Re Pierre MD.    Assessment/Plan   Allergies, medications, history, and pertinent labs/EKGs/Imaging reviewed by Re Pierre MD.     Medical Decision Making      Orders and Diagnoses  There are no diagnoses linked to this encounter.    Medical Admin Record      Patient disposition: Home    Electronically signed by Re Pierre MD  7:18 PM

## 2024-11-13 ENCOUNTER — OFFICE VISIT (OUTPATIENT)
Dept: PRIMARY CARE | Facility: CLINIC | Age: 64
End: 2024-11-13
Payer: MEDICARE

## 2024-11-13 VITALS
SYSTOLIC BLOOD PRESSURE: 160 MMHG | BODY MASS INDEX: 21.35 KG/M2 | DIASTOLIC BLOOD PRESSURE: 80 MMHG | HEIGHT: 62 IN | OXYGEN SATURATION: 94 % | WEIGHT: 116 LBS | HEART RATE: 75 BPM

## 2024-11-13 DIAGNOSIS — J45.21 MILD INTERMITTENT REACTIVE AIRWAY DISEASE WITH ACUTE EXACERBATION (HHS-HCC): Primary | ICD-10-CM

## 2024-11-13 PROCEDURE — G2211 COMPLEX E/M VISIT ADD ON: HCPCS | Performed by: STUDENT IN AN ORGANIZED HEALTH CARE EDUCATION/TRAINING PROGRAM

## 2024-11-13 PROCEDURE — 1157F ADVNC CARE PLAN IN RCRD: CPT | Performed by: STUDENT IN AN ORGANIZED HEALTH CARE EDUCATION/TRAINING PROGRAM

## 2024-11-13 PROCEDURE — 4004F PT TOBACCO SCREEN RCVD TLK: CPT | Performed by: STUDENT IN AN ORGANIZED HEALTH CARE EDUCATION/TRAINING PROGRAM

## 2024-11-13 PROCEDURE — 3008F BODY MASS INDEX DOCD: CPT | Performed by: STUDENT IN AN ORGANIZED HEALTH CARE EDUCATION/TRAINING PROGRAM

## 2024-11-13 PROCEDURE — 1160F RVW MEDS BY RX/DR IN RCRD: CPT | Performed by: STUDENT IN AN ORGANIZED HEALTH CARE EDUCATION/TRAINING PROGRAM

## 2024-11-13 PROCEDURE — 1159F MED LIST DOCD IN RCRD: CPT | Performed by: STUDENT IN AN ORGANIZED HEALTH CARE EDUCATION/TRAINING PROGRAM

## 2024-11-13 PROCEDURE — 99214 OFFICE O/P EST MOD 30 MIN: CPT | Performed by: STUDENT IN AN ORGANIZED HEALTH CARE EDUCATION/TRAINING PROGRAM

## 2024-11-13 RX ORDER — FLUTICASONE FUROATE AND VILANTEROL 100; 25 UG/1; UG/1
1 POWDER RESPIRATORY (INHALATION) DAILY
Qty: 30 EACH | Refills: 11 | Status: SHIPPED | OUTPATIENT
Start: 2024-11-13

## 2024-11-13 NOTE — PROGRESS NOTES
"Subjective   Patient ID: Sherry Joshua is a 64 y.o. female who presents for Follow-up (ER follow-up asthma. ).        HPI    63 y/o female with eating disorder , former alcoholic , long term former smoker ( Quit in jan 2024 , smoked for 10 years )  , bipolar disorder( established with Psychiatry ) , insomnia , HPL       Recent UC  and ER visit for asthma / COPD - E   Currently on prednisone burst         Visit Vitals  BP (!) 178/91   Pulse 75   Ht 1.575 m (5' 2\")   Wt 52.6 kg (116 lb)   LMP  (LMP Unknown)   SpO2 94%   BMI 21.22 kg/m²   OB Status Postmenopausal   Smoking Status Every Day   BSA 1.52 m²      No LMP recorded (lmp unknown). Patient is postmenopausal.   Current Outpatient Medications   Medication Instructions    albuterol (Ventolin HFA) 90 mcg/actuation inhaler 2 puffs, inhalation, Every 6 hours PRN    atorvastatin (LIPITOR) 40 mg, oral, Daily    desvenlafaxine (Pristiq) 50 mg 24 hr tablet 1 tablet, Every Day    gabapentin (NEURONTIN) 600 mg, oral, Nightly    lamoTRIgine (LaMICtal) 200 mg tablet 1 tablet, Every morning    predniSONE (DELTASONE) 60 mg, oral, Daily    risperiDONE (RisperDAL) 0.5 mg tablet Take by mouth. 1 tablet by mouth twice daily and one-half tablet by mouth at bed    risperiDONE (RisperDAL) 1 mg tablet 1 tablet, Every morning    traZODone (DESYREL) 75 mg, oral, Nightly      Social History     Tobacco Use    Smoking status: Every Day     Types: Cigarettes     Passive exposure: Never    Smokeless tobacco: Never    Tobacco comments:     2 cigarettes per days since the last 7years  (After quitting alcohol ) as of 3/23   Substance Use Topics    Alcohol use: Never        Review of Systems    Constitutional : No feeling poorly / fevers/ chills / night sweats/ fatigue   Cardiovascular : No CP /Palpitations/ lower extremity edema / syncope   Respiratory : No Cough /MANRIQUE/Dyspnea at rest   Gastrointestinal : No abd pain / N/V  No bloody stools/ melena / constipation  Endo : No " polyuria/polydipsia/ muscle weakness / sluggishness   CNS: No confusion / HA/ tingling/ numbness/ weakness of extremities  Psychiatric: No anxiety/ depression/ SI/HI    All other systems have been reviewed and are negative for complaint       Physical Exam    Constitutional : Vitals reviewed. Alert and in no distress  Cardiovascular : RRR, Normal S1, S2, No pericardial rub/ gallop, no peripheral edema   Pulmonary: No respiratory distress, CTAB   MSK : Normal gait and station , strength and tone   Skin: Warm to touch ,  normal skin turgor   Neurologic : CNs 2-12 grossly intact , no obvious FNDs  Psych : A,Ox3, normal mood and affect      Assessment/Plan   Diagnoses and all orders for this visit:  Mild intermittent reactive airway disease with acute exacerbation (HHS-HCC)  -     fluticasone furoate-vilanteroL (Breo Ellipta) 100-25 mcg/dose inhaler; Inhale 1 puff once daily.  -     Referral to Pulmonology; Future    65 y/o female with eating disorder , former alcoholic , long term former smoker ( Quit in jan 2024 , smoked for 10 years )  , bipolar disorder( established with Psychiatry ) , insomnia , HPL      Start Breo   Finish prednisone burst   Labs to be done soon   RTO as previously advised                 Conditions addressed and mgmt as noted above.  Pertinent labs, images/ imaging reports , chart review was done .   Age appropriate labs / labs for mgmt of chronic medical conditions ordered, further mgmt pending the results.       This note is intended for the physician writing it, as well as to communicate findings to other healthcare professionals. These notes use medical lexicon that may be misunderstood by non medical persons. Therefore, interpretations of medical notes and terminology should be approached with caution.

## 2024-11-20 ENCOUNTER — TELEPHONE (OUTPATIENT)
Dept: PRIMARY CARE | Facility: CLINIC | Age: 64
End: 2024-11-20
Payer: MEDICARE

## 2024-11-20 DIAGNOSIS — I10 PRIMARY HYPERTENSION: ICD-10-CM

## 2024-11-20 DIAGNOSIS — R73.03 PREDIABETES: Primary | ICD-10-CM

## 2024-11-20 RX ORDER — AMLODIPINE BESYLATE 5 MG/1
5 TABLET ORAL DAILY
Qty: 30 TABLET | Refills: 5 | Status: SHIPPED | OUTPATIENT
Start: 2024-11-20 | End: 2025-05-19

## 2025-01-14 ENCOUNTER — LAB (OUTPATIENT)
Dept: LAB | Facility: LAB | Age: 65
End: 2025-01-14
Payer: MEDICARE

## 2025-01-14 DIAGNOSIS — I10 PRIMARY HYPERTENSION: ICD-10-CM

## 2025-01-14 DIAGNOSIS — Z00.00 MEDICARE ANNUAL WELLNESS VISIT, SUBSEQUENT: ICD-10-CM

## 2025-01-14 DIAGNOSIS — Z78.0 OSTEOPENIA AFTER MENOPAUSE: ICD-10-CM

## 2025-01-14 DIAGNOSIS — M85.80 OSTEOPENIA AFTER MENOPAUSE: ICD-10-CM

## 2025-01-14 DIAGNOSIS — E78.2 MIXED HYPERLIPIDEMIA: ICD-10-CM

## 2025-01-14 LAB
25(OH)D3 SERPL-MCNC: 49 NG/ML (ref 30–100)
ALBUMIN SERPL BCP-MCNC: 4.4 G/DL (ref 3.4–5)
ALP SERPL-CCNC: 82 U/L (ref 33–136)
ALT SERPL W P-5'-P-CCNC: 14 U/L (ref 7–45)
ANION GAP SERPL CALC-SCNC: 14 MMOL/L (ref 10–20)
AST SERPL W P-5'-P-CCNC: 12 U/L (ref 9–39)
BILIRUB SERPL-MCNC: 0.7 MG/DL (ref 0–1.2)
BUN SERPL-MCNC: 18 MG/DL (ref 6–23)
CALCIUM SERPL-MCNC: 9.3 MG/DL (ref 8.6–10.6)
CHLORIDE SERPL-SCNC: 104 MMOL/L (ref 98–107)
CHOLEST SERPL-MCNC: 208 MG/DL (ref 0–199)
CHOLESTEROL/HDL RATIO: 2.2
CO2 SERPL-SCNC: 27 MMOL/L (ref 21–32)
CREAT SERPL-MCNC: 0.9 MG/DL (ref 0.5–1.05)
EGFRCR SERPLBLD CKD-EPI 2021: 71 ML/MIN/1.73M*2
ERYTHROCYTE [DISTWIDTH] IN BLOOD BY AUTOMATED COUNT: 12.7 % (ref 11.5–14.5)
EST. AVERAGE GLUCOSE BLD GHB EST-MCNC: 117 MG/DL
GLUCOSE SERPL-MCNC: 84 MG/DL (ref 74–99)
HBA1C MFR BLD: 5.7 %
HCT VFR BLD AUTO: 39.2 % (ref 36–46)
HDLC SERPL-MCNC: 92.7 MG/DL
HGB BLD-MCNC: 12.7 G/DL (ref 12–16)
LDLC SERPL CALC-MCNC: 101 MG/DL
MCH RBC QN AUTO: 29.3 PG (ref 26–34)
MCHC RBC AUTO-ENTMCNC: 32.4 G/DL (ref 32–36)
MCV RBC AUTO: 90 FL (ref 80–100)
NON HDL CHOLESTEROL: 115 MG/DL (ref 0–149)
NRBC BLD-RTO: 0 /100 WBCS (ref 0–0)
PLATELET # BLD AUTO: 214 X10*3/UL (ref 150–450)
POTASSIUM SERPL-SCNC: 3.7 MMOL/L (ref 3.5–5.3)
PROT SERPL-MCNC: 6 G/DL (ref 6.4–8.2)
RBC # BLD AUTO: 4.34 X10*6/UL (ref 4–5.2)
SODIUM SERPL-SCNC: 141 MMOL/L (ref 136–145)
TRIGL SERPL-MCNC: 73 MG/DL (ref 0–149)
TSH SERPL-ACNC: 1.23 MIU/L (ref 0.44–3.98)
VLDL: 15 MG/DL (ref 0–40)
WBC # BLD AUTO: 5.3 X10*3/UL (ref 4.4–11.3)

## 2025-01-14 PROCEDURE — 83036 HEMOGLOBIN GLYCOSYLATED A1C: CPT

## 2025-01-14 PROCEDURE — 82306 VITAMIN D 25 HYDROXY: CPT

## 2025-01-14 PROCEDURE — 80061 LIPID PANEL: CPT

## 2025-01-14 PROCEDURE — 84443 ASSAY THYROID STIM HORMONE: CPT

## 2025-01-14 PROCEDURE — 85027 COMPLETE CBC AUTOMATED: CPT

## 2025-01-14 PROCEDURE — 80053 COMPREHEN METABOLIC PANEL: CPT

## 2025-03-24 ENCOUNTER — APPOINTMENT (OUTPATIENT)
Dept: PRIMARY CARE | Facility: CLINIC | Age: 65
End: 2025-03-24
Payer: MEDICARE

## 2025-03-31 DIAGNOSIS — E78.00 HYPERCHOLESTEROLEMIA: ICD-10-CM

## 2025-04-01 RX ORDER — ATORVASTATIN CALCIUM 40 MG/1
40 TABLET, FILM COATED ORAL DAILY
Qty: 90 TABLET | Refills: 0 | Status: SHIPPED | OUTPATIENT
Start: 2025-04-01

## 2025-05-12 RX ORDER — DESVENLAFAXINE 50 MG/1
50 TABLET, EXTENDED RELEASE ORAL
Qty: 90 TABLET | Refills: 0 | OUTPATIENT
Start: 2025-05-12

## 2025-05-23 DIAGNOSIS — I10 PRIMARY HYPERTENSION: ICD-10-CM

## 2025-05-24 RX ORDER — AMLODIPINE BESYLATE 5 MG/1
5 TABLET ORAL DAILY
Qty: 30 TABLET | Refills: 0 | Status: SHIPPED | OUTPATIENT
Start: 2025-05-24

## 2025-06-09 ENCOUNTER — APPOINTMENT (OUTPATIENT)
Dept: PRIMARY CARE | Facility: CLINIC | Age: 65
End: 2025-06-09
Payer: MEDICARE

## 2025-06-13 ENCOUNTER — HOSPITAL ENCOUNTER (OUTPATIENT)
Dept: RADIOLOGY | Facility: CLINIC | Age: 65
Discharge: HOME | End: 2025-06-13
Payer: MEDICARE

## 2025-06-13 ENCOUNTER — APPOINTMENT (OUTPATIENT)
Dept: ORTHOPEDIC SURGERY | Facility: CLINIC | Age: 65
End: 2025-06-13
Payer: MEDICARE

## 2025-06-13 DIAGNOSIS — M25.511 RIGHT SHOULDER PAIN, UNSPECIFIED CHRONICITY: Primary | ICD-10-CM

## 2025-06-13 DIAGNOSIS — M19.011 OSTEOARTHRITIS OF RIGHT GLENOHUMERAL JOINT: ICD-10-CM

## 2025-06-13 DIAGNOSIS — M25.511 RIGHT SHOULDER PAIN, UNSPECIFIED CHRONICITY: ICD-10-CM

## 2025-06-13 PROCEDURE — 73030 X-RAY EXAM OF SHOULDER: CPT | Mod: RT

## 2025-06-13 PROCEDURE — 73030 X-RAY EXAM OF SHOULDER: CPT | Mod: RIGHT SIDE | Performed by: RADIOLOGY

## 2025-06-13 NOTE — PROGRESS NOTES
Sherry Joshua is a 65 y.o. year-old RHD  female  she is a new patient to our office and presents with a chief complaint of Right shoulder pain.  She reports the pain started approximately 6 weeks ago.  No inciting event or injury.  Denies any issue with the right shoulder prior to 6 weeks ago.  Reports achiness and soreness in the anterior shoulder.  Worse with lateral reaching movements.  Denies much symptoms with overhead movements.  Has tried Tylenol and ibuprofen with some relief.  Of note, she is an avid pickleball player.      Past Medical, Family, and Social History reviewed     Review of Systems  A complete review of systems was conducted, pertinent only to the HPI noted above.    Physical Exam  GEN: Alert and Oriented x 3  Constitutional: Well appearing, in no apparent distress.  Eyes: sclera anicteric  ENT: hearing appropriate for normal conversation, neck appears symmetric with no gross thyromegaly  Pulm: No labored breathing, no wheezing  CVS: Regular rate and rhythm  PSY: normal mood and affect  Skin: No rashes, erythema, or induration around shoulder     Focused Musculoskeletal Exam:     Side: Right shoulder:  PROM:   FE (170)   ER 30 ABER/ABIR: 90/90  AROM:   FE (170)   ER 30 IR T8  Strength:  Supra [5/5] Infra [5/5] Subscap [5/5]  Abd [5/5]    Special Tests  Shoulder    ACJ:  AC TTP: [neg]  Cross Arm [neg]  AC prominence [no]      [Sensation intact Ax/median/ulnar/radial distributions  Motor intact Ax/median/radial/ulnar/AIN/PIN    X-rays of the shoulder independently viewed and interpreted: Demonstrate degenerative changes with joint space narrowing and inferior humeral osteophyte formation.  No evidence of decreased humeral acromial interval.    The patient history, physical examination and imaging studies are consistent with primary osteoarthritis of the shoulder.    After a thorough discussion with the patient including expectations, I would recommend a conservative program for now.  We  discussed home/formal PT (deltoid isometrics, RTC strengthening, scapular stabilizers, stretching) and activity modification including avoidance of the positions and activities that provoke symptoms, including athletics.  We also discussed the ice and NSAIDS/tylenol. I provided a steroid injection today at her request. I discussed she may get an injection up to every 3 months if they continue to provide her with relief.     Follow-up as needed for repeat injection if indicated.     Joint Aspiration/Injection    Date/Time: 6/13/2025 3:04 PM    Performed by: Chan Gonzalez MD  Authorized by: Chan Gonzalez MD    Consent:     Consent obtained:  Verbal    Consent given by:  Patient    Risks, benefits, and alternatives were discussed: yes      Alternatives discussed:  Observation  Location:     Location:  Shoulder    Shoulder:  R glenohumeral  Procedure details:     Needle gauge:  20 G    Steroid injected: yes    Post-procedure details:     Dressing:  Adhesive bandage

## 2025-06-23 DIAGNOSIS — I10 PRIMARY HYPERTENSION: ICD-10-CM

## 2025-06-23 DIAGNOSIS — E78.00 HYPERCHOLESTEROLEMIA: ICD-10-CM

## 2025-06-26 RX ORDER — AMLODIPINE BESYLATE 5 MG/1
5 TABLET ORAL DAILY
Qty: 30 TABLET | Refills: 0 | Status: SHIPPED | OUTPATIENT
Start: 2025-06-26

## 2025-06-26 RX ORDER — ATORVASTATIN CALCIUM 40 MG/1
40 TABLET, FILM COATED ORAL DAILY
Qty: 30 TABLET | Refills: 0 | Status: SHIPPED | OUTPATIENT
Start: 2025-06-26

## 2025-07-29 DIAGNOSIS — I10 PRIMARY HYPERTENSION: ICD-10-CM

## 2025-07-29 DIAGNOSIS — E78.00 HYPERCHOLESTEROLEMIA: ICD-10-CM

## 2025-07-29 RX ORDER — ATORVASTATIN CALCIUM 40 MG/1
40 TABLET, FILM COATED ORAL DAILY
Qty: 30 TABLET | Refills: 1 | Status: SHIPPED | OUTPATIENT
Start: 2025-07-29

## 2025-07-29 RX ORDER — AMLODIPINE BESYLATE 5 MG/1
5 TABLET ORAL DAILY
Qty: 30 TABLET | Refills: 1 | Status: SHIPPED | OUTPATIENT
Start: 2025-07-29

## 2025-08-04 DIAGNOSIS — F51.04 CHRONIC INSOMNIA: ICD-10-CM

## 2025-08-05 RX ORDER — TRAZODONE HYDROCHLORIDE 50 MG/1
75 TABLET ORAL NIGHTLY
Qty: 135 TABLET | Refills: 0 | Status: SHIPPED | OUTPATIENT
Start: 2025-08-05

## 2025-08-29 ENCOUNTER — OFFICE VISIT (OUTPATIENT)
Dept: ORTHOPEDIC SURGERY | Facility: CLINIC | Age: 65
End: 2025-08-29
Payer: MEDICARE

## 2025-08-29 DIAGNOSIS — M19.011 OSTEOARTHRITIS OF RIGHT GLENOHUMERAL JOINT: Primary | ICD-10-CM

## 2025-09-10 ENCOUNTER — APPOINTMENT (OUTPATIENT)
Dept: PRIMARY CARE | Facility: CLINIC | Age: 65
End: 2025-09-10
Payer: MEDICARE

## 2025-10-31 ENCOUNTER — APPOINTMENT (OUTPATIENT)
Dept: PRIMARY CARE | Facility: CLINIC | Age: 65
End: 2025-10-31
Payer: MEDICARE